# Patient Record
Sex: FEMALE | Race: WHITE | URBAN - METROPOLITAN AREA
[De-identification: names, ages, dates, MRNs, and addresses within clinical notes are randomized per-mention and may not be internally consistent; named-entity substitution may affect disease eponyms.]

---

## 2023-02-02 ENCOUNTER — OFFICE VISIT (OUTPATIENT)
Dept: URBAN - METROPOLITAN AREA CLINIC 16 | Facility: CLINIC | Age: 63
End: 2023-02-02
Payer: COMMERCIAL

## 2023-02-02 DIAGNOSIS — H02.839 DERMATOCHALASIS OF UNSPECIFIED EYE, UNSPECIFIED EYELID: Primary | ICD-10-CM

## 2023-02-02 DIAGNOSIS — H25.13 AGE-RELATED NUCLEAR CATARACT, BILATERAL: ICD-10-CM

## 2023-02-02 PROCEDURE — 92004 COMPRE OPH EXAM NEW PT 1/>: CPT | Performed by: OPTOMETRIST

## 2023-02-02 ASSESSMENT — KERATOMETRY
OD: 45.50
OS: 45.25

## 2023-02-02 ASSESSMENT — INTRAOCULAR PRESSURE
OD: 15
OS: 15

## 2023-02-02 NOTE — IMPRESSION/PLAN
Impression: Dermatochalasis of unspecified eye, unspecified eyelid: H02.839. Plan: Discussed condition w/pt. Pt feels daily tasks are more difficult to do without opening eyes widely, or lifting eyebrows with fingers. Refer for consult w/oculoplastic specialist, next available.

## 2023-02-20 ENCOUNTER — OFFICE VISIT (OUTPATIENT)
Dept: URBAN - METROPOLITAN AREA CLINIC 28 | Facility: CLINIC | Age: 63
End: 2023-02-20
Payer: COMMERCIAL

## 2023-02-20 DIAGNOSIS — H02.413 MECHANICAL PTOSIS OF BILATERAL EYELIDS: ICD-10-CM

## 2023-02-20 DIAGNOSIS — H25.13 AGE-RELATED NUCLEAR CATARACT, BILATERAL: ICD-10-CM

## 2023-02-20 DIAGNOSIS — L98.8 OTHER SPECIFIED DISORDER OF THE SUBCUTANEOUS TISSUE: ICD-10-CM

## 2023-02-20 DIAGNOSIS — H53.40 VISUAL FIELD DEFECT: Primary | ICD-10-CM

## 2023-02-20 DIAGNOSIS — H57.813 BROW PTOSIS, BILATERAL: ICD-10-CM

## 2023-02-20 DIAGNOSIS — H02.831 DERMATOCHALASIS OF RIGHT UPPER EYELID: ICD-10-CM

## 2023-02-20 DIAGNOSIS — H02.834 DERMATOCHALASIS OF LEFT UPPER EYELID: ICD-10-CM

## 2023-02-20 PROCEDURE — 99204 OFFICE O/P NEW MOD 45 MIN: CPT | Performed by: OPHTHALMOLOGY

## 2023-02-20 PROCEDURE — 92081 LIMITED VISUAL FIELD XM: CPT | Performed by: OPHTHALMOLOGY

## 2023-02-20 PROCEDURE — 92285 EXTERNAL OCULAR PHOTOGRAPHY: CPT | Performed by: OPHTHALMOLOGY

## 2023-02-20 ASSESSMENT — INTRAOCULAR PRESSURE
OS: 17
OD: 16

## 2023-02-20 NOTE — IMPRESSION/PLAN
Impression: Visual field defect: H53.40. Plan: Patient subjectively describes peripheral field defect. VF ordered & reviewed. Formal visual field testing reflects the clinical concern at this time. Ptosis effect OD at 10 degrees untapped & 50 degrees taped improvement. Ptosis effect OS at fixation untapped & 55 degrees taped improvement.

## 2023-02-20 NOTE — IMPRESSION/PLAN
Impression: Other specified disorder of the subcutaneous tissue: L98.8. Plan: PATIENT HAS RHYTIDOSIS FACIALIS AND SIGNS OF SUN EXPOSURE. MUST F/U WITH DERMATOLOGY.

## 2023-02-20 NOTE — IMPRESSION/PLAN
Impression: Brow ptosis, bilateral: H57.813. Plan: Patient examined. Chart Reviewed. Patient has signs and symptoms and findings consistent with visually significant brow ptosis (mechanical ptosis and upper eyelid dermatochalasis skin.) This was diagrammatically explained to the patient. Patient voiced understanding. Discussed known options for management (do nothing, conservative management, and surgical intervention.)  Patient elects surgical intervention at this time. Benefits and risks of direct brow ptosis  lift  and upper eyelid blepharoplasty: bleeding, infection, dry eye, asymmetry, numbness, and/or need for further surgery. Patient wishes to proceed  with surgery.  

[id number] DIRECT BROW LIFT
[id number] - BILATERAL UPPER LID SKIN ONLY BLEPHAROPLASTY

## 2023-08-10 ENCOUNTER — OFFICE VISIT (OUTPATIENT)
Dept: URBAN - METROPOLITAN AREA CLINIC 24 | Facility: CLINIC | Age: 63
End: 2023-08-10
Payer: COMMERCIAL

## 2023-08-10 DIAGNOSIS — H02.831 DERMATOCHALASIS OF RIGHT UPPER EYELID: Primary | ICD-10-CM

## 2023-08-10 DIAGNOSIS — H02.834 DERMATOCHALASIS OF LEFT UPPER EYELID: ICD-10-CM

## 2023-08-10 DIAGNOSIS — Z41.1 ENCOUNTER FOR COSMETIC SURGERY: ICD-10-CM

## 2023-08-10 PROCEDURE — 99211 OFF/OP EST MAY X REQ PHY/QHP: CPT | Performed by: OPHTHALMOLOGY

## 2023-08-16 ENCOUNTER — OFFICE VISIT (OUTPATIENT)
Dept: URBAN - METROPOLITAN AREA CLINIC 28 | Facility: CLINIC | Age: 63
End: 2023-08-16
Payer: COMMERCIAL

## 2023-08-16 DIAGNOSIS — H52.03 HYPERMETROPIA, BILATERAL: Primary | ICD-10-CM

## 2023-08-16 PROCEDURE — 92002 INTRM OPH EXAM NEW PATIENT: CPT | Performed by: OPTOMETRIST

## 2023-08-16 ASSESSMENT — VISUAL ACUITY
OD: 20/20
OS: 20/20

## 2023-08-16 ASSESSMENT — INTRAOCULAR PRESSURE
OS: 15
OD: 16